# Patient Record
Sex: FEMALE | Race: BLACK OR AFRICAN AMERICAN | NOT HISPANIC OR LATINO | Employment: UNEMPLOYED | URBAN - METROPOLITAN AREA
[De-identification: names, ages, dates, MRNs, and addresses within clinical notes are randomized per-mention and may not be internally consistent; named-entity substitution may affect disease eponyms.]

---

## 2024-07-22 ENCOUNTER — APPOINTMENT (OUTPATIENT)
Dept: OBSTETRICS AND GYNECOLOGY | Facility: CLINIC | Age: 44
End: 2024-07-22
Payer: COMMERCIAL

## 2024-08-23 RX ORDER — ALBUTEROL SULFATE 90 UG/1
2 INHALANT RESPIRATORY (INHALATION) EVERY 4 HOURS PRN
COMMUNITY
Start: 2023-08-15

## 2024-08-23 RX ORDER — ARIPIPRAZOLE 2 MG/1
2 TABLET ORAL
COMMUNITY
Start: 2023-11-15

## 2024-08-23 RX ORDER — CITALOPRAM 10 MG/1
10 TABLET ORAL
COMMUNITY
Start: 2023-11-15

## 2024-08-23 RX ORDER — METRONIDAZOLE 500 MG/1
TABLET ORAL
COMMUNITY
Start: 2023-11-22

## 2024-08-23 RX ORDER — LIDOCAINE 50 MG/G
1 PATCH TOPICAL
COMMUNITY
Start: 2023-05-25

## 2024-08-23 RX ORDER — FERROUS SULFATE 325(65) MG
325 TABLET ORAL EVERY OTHER DAY
COMMUNITY
Start: 2024-07-16 | End: 2025-07-16

## 2024-08-23 RX ORDER — NORGESTIMATE AND ETHINYL ESTRADIOL 0.25-0.035
1 KIT ORAL DAILY
COMMUNITY

## 2024-08-23 RX ORDER — DESOGESTREL AND ETHINYL ESTRADIOL 0.15-0.03
1 KIT ORAL DAILY
COMMUNITY
Start: 2015-09-23

## 2024-08-23 RX ORDER — ACETAMINOPHEN 325 MG/1
2 TABLET ORAL EVERY 6 HOURS PRN
COMMUNITY
Start: 2023-05-25

## 2024-08-26 ENCOUNTER — OFFICE VISIT (OUTPATIENT)
Dept: PRIMARY CARE | Facility: CLINIC | Age: 44
End: 2024-08-26
Payer: COMMERCIAL

## 2024-08-26 ENCOUNTER — LAB (OUTPATIENT)
Dept: LAB | Facility: LAB | Age: 44
End: 2024-08-26
Payer: COMMERCIAL

## 2024-08-26 ENCOUNTER — APPOINTMENT (OUTPATIENT)
Dept: PRIMARY CARE | Facility: CLINIC | Age: 44
End: 2024-08-26

## 2024-08-26 VITALS
OXYGEN SATURATION: 99 % | HEIGHT: 64 IN | SYSTOLIC BLOOD PRESSURE: 122 MMHG | HEART RATE: 71 BPM | BODY MASS INDEX: 38.97 KG/M2 | DIASTOLIC BLOOD PRESSURE: 73 MMHG | WEIGHT: 228.25 LBS | RESPIRATION RATE: 16 BRPM | TEMPERATURE: 97.8 F

## 2024-08-26 DIAGNOSIS — Z00.00 ANNUAL PHYSICAL EXAM: ICD-10-CM

## 2024-08-26 DIAGNOSIS — Z13.220 LIPID SCREENING: ICD-10-CM

## 2024-08-26 DIAGNOSIS — Z13.1 SCREENING FOR DIABETES MELLITUS: ICD-10-CM

## 2024-08-26 DIAGNOSIS — Z13.29 SCREENING FOR THYROID DISORDER: ICD-10-CM

## 2024-08-26 DIAGNOSIS — Z00.00 ENCOUNTER FOR MEDICAL EXAMINATION TO ESTABLISH CARE: Primary | ICD-10-CM

## 2024-08-26 DIAGNOSIS — Z13.0 SCREENING FOR DEFICIENCY ANEMIA: ICD-10-CM

## 2024-08-26 DIAGNOSIS — J45.20 MILD INTERMITTENT ASTHMA WITHOUT COMPLICATION (HHS-HCC): ICD-10-CM

## 2024-08-26 DIAGNOSIS — Z13.89 SCREENING FOR BLOOD OR PROTEIN IN URINE: ICD-10-CM

## 2024-08-26 DIAGNOSIS — Z86.018 HISTORY OF UTERINE FIBROID: ICD-10-CM

## 2024-08-26 PROBLEM — M79.7 FIBROMYALGIA: Status: ACTIVE | Noted: 2023-07-19

## 2024-08-26 PROBLEM — F43.10 POST TRAUMATIC STRESS DISORDER: Status: ACTIVE | Noted: 2022-07-25

## 2024-08-26 PROBLEM — F33.1 MODERATE EPISODE OF RECURRENT MAJOR DEPRESSIVE DISORDER (MULTI): Chronic | Status: ACTIVE | Noted: 2022-07-25

## 2024-08-26 PROBLEM — F60.2 ANTISOCIAL PERSONALITY DISORDER (MULTI): Status: ACTIVE | Noted: 2017-01-03

## 2024-08-26 PROBLEM — L20.84 INTRINSIC ECZEMA: Status: ACTIVE | Noted: 2020-06-10

## 2024-08-26 PROBLEM — D25.9 FIBROID, UTERINE: Status: ACTIVE | Noted: 2024-08-26

## 2024-08-26 LAB
ALBUMIN SERPL BCP-MCNC: 4.1 G/DL (ref 3.4–5)
ALP SERPL-CCNC: 46 U/L (ref 33–110)
ALT SERPL W P-5'-P-CCNC: 7 U/L (ref 7–45)
ANION GAP SERPL CALC-SCNC: 10 MMOL/L (ref 10–20)
AST SERPL W P-5'-P-CCNC: 11 U/L (ref 9–39)
BILIRUB SERPL-MCNC: 0.3 MG/DL (ref 0–1.2)
BUN SERPL-MCNC: 12 MG/DL (ref 6–23)
CALCIUM SERPL-MCNC: 8.7 MG/DL (ref 8.6–10.3)
CHLORIDE SERPL-SCNC: 104 MMOL/L (ref 98–107)
CHOLEST SERPL-MCNC: 106 MG/DL (ref 0–199)
CHOLESTEROL/HDL RATIO: 3.8
CO2 SERPL-SCNC: 26 MMOL/L (ref 21–32)
CREAT SERPL-MCNC: 0.69 MG/DL (ref 0.5–1.05)
EGFRCR SERPLBLD CKD-EPI 2021: >90 ML/MIN/1.73M*2
ERYTHROCYTE [DISTWIDTH] IN BLOOD BY AUTOMATED COUNT: 22.1 % (ref 11.5–14.5)
GLUCOSE SERPL-MCNC: 96 MG/DL (ref 74–99)
HCT VFR BLD AUTO: 23.3 % (ref 36–46)
HDLC SERPL-MCNC: 27.8 MG/DL
HGB BLD-MCNC: 6 G/DL (ref 12–16)
LDLC SERPL CALC-MCNC: 40 MG/DL
MCH RBC QN AUTO: 17.2 PG (ref 26–34)
MCHC RBC AUTO-ENTMCNC: 25.8 G/DL (ref 32–36)
MCV RBC AUTO: 67 FL (ref 80–100)
NON HDL CHOLESTEROL: 78 MG/DL (ref 0–149)
NRBC BLD-RTO: 0 /100 WBCS (ref 0–0)
PLATELET # BLD AUTO: 301 X10*3/UL (ref 150–450)
POC APPEARANCE, URINE: CLEAR
POC BILIRUBIN, URINE: NEGATIVE
POC BLOOD, URINE: NEGATIVE
POC COLOR, URINE: YELLOW
POC GLUCOSE, URINE: NEGATIVE MG/DL
POC KETONES, URINE: NEGATIVE MG/DL
POC LEUKOCYTES, URINE: NEGATIVE
POC NITRITE,URINE: NEGATIVE
POC PH, URINE: 6 PH
POC PROTEIN, URINE: NEGATIVE MG/DL
POC SPECIFIC GRAVITY, URINE: 1.01
POC UROBILINOGEN, URINE: 0.2 EU/DL
POTASSIUM SERPL-SCNC: 3.9 MMOL/L (ref 3.5–5.3)
PROT SERPL-MCNC: 7.2 G/DL (ref 6.4–8.2)
RBC # BLD AUTO: 3.48 X10*6/UL (ref 4–5.2)
SODIUM SERPL-SCNC: 136 MMOL/L (ref 136–145)
TRIGL SERPL-MCNC: 192 MG/DL (ref 0–149)
TSH SERPL-ACNC: 1.27 MIU/L (ref 0.44–3.98)
VLDL: 38 MG/DL (ref 0–40)
WBC # BLD AUTO: 3.9 X10*3/UL (ref 4.4–11.3)

## 2024-08-26 PROCEDURE — 83036 HEMOGLOBIN GLYCOSYLATED A1C: CPT

## 2024-08-26 PROCEDURE — 99214 OFFICE O/P EST MOD 30 MIN: CPT | Performed by: NURSE PRACTITIONER

## 2024-08-26 PROCEDURE — 80053 COMPREHEN METABOLIC PANEL: CPT

## 2024-08-26 PROCEDURE — 36415 COLL VENOUS BLD VENIPUNCTURE: CPT

## 2024-08-26 PROCEDURE — 85027 COMPLETE CBC AUTOMATED: CPT

## 2024-08-26 PROCEDURE — 81003 URINALYSIS AUTO W/O SCOPE: CPT | Mod: QW | Performed by: NURSE PRACTITIONER

## 2024-08-26 PROCEDURE — 84443 ASSAY THYROID STIM HORMONE: CPT

## 2024-08-26 PROCEDURE — 80061 LIPID PANEL: CPT

## 2024-08-26 PROCEDURE — 3008F BODY MASS INDEX DOCD: CPT | Performed by: NURSE PRACTITIONER

## 2024-08-26 PROCEDURE — 99204 OFFICE O/P NEW MOD 45 MIN: CPT | Performed by: NURSE PRACTITIONER

## 2024-08-26 RX ORDER — ALBUTEROL SULFATE 90 UG/1
2 INHALANT RESPIRATORY (INHALATION) EVERY 6 HOURS PRN
Qty: 18 G | Refills: 0 | Status: SHIPPED | OUTPATIENT
Start: 2024-08-26

## 2024-08-26 SDOH — ECONOMIC STABILITY: FOOD INSECURITY: WITHIN THE PAST 12 MONTHS, THE FOOD YOU BOUGHT JUST DIDN'T LAST AND YOU DIDN'T HAVE MONEY TO GET MORE.: NEVER TRUE

## 2024-08-26 SDOH — ECONOMIC STABILITY: FOOD INSECURITY: WITHIN THE PAST 12 MONTHS, YOU WORRIED THAT YOUR FOOD WOULD RUN OUT BEFORE YOU GOT MONEY TO BUY MORE.: NEVER TRUE

## 2024-08-26 ASSESSMENT — PAIN SCALES - GENERAL: PAINLEVEL: 7

## 2024-08-26 ASSESSMENT — LIFESTYLE VARIABLES
AUDIT-C TOTAL SCORE: 2
SKIP TO QUESTIONS 9-10: 1
HOW OFTEN DO YOU HAVE A DRINK CONTAINING ALCOHOL: 2-4 TIMES A MONTH
HOW OFTEN DO YOU HAVE SIX OR MORE DRINKS ON ONE OCCASION: NEVER
HOW MANY STANDARD DRINKS CONTAINING ALCOHOL DO YOU HAVE ON A TYPICAL DAY: 1 OR 2

## 2024-08-26 ASSESSMENT — PATIENT HEALTH QUESTIONNAIRE - PHQ9
1. LITTLE INTEREST OR PLEASURE IN DOING THINGS: NOT AT ALL
SUM OF ALL RESPONSES TO PHQ9 QUESTIONS 1 AND 2: 3
8. MOVING OR SPEAKING SO SLOWLY THAT OTHER PEOPLE COULD HAVE NOTICED. OR THE OPPOSITE, BEING SO FIGETY OR RESTLESS THAT YOU HAVE BEEN MOVING AROUND A LOT MORE THAN USUAL: NEARLY EVERY DAY
3. TROUBLE FALLING OR STAYING ASLEEP OR SLEEPING TOO MUCH: NEARLY EVERY DAY
10. IF YOU CHECKED OFF ANY PROBLEMS, HOW DIFFICULT HAVE THESE PROBLEMS MADE IT FOR YOU TO DO YOUR WORK, TAKE CARE OF THINGS AT HOME, OR GET ALONG WITH OTHER PEOPLE: NOT DIFFICULT AT ALL
5. POOR APPETITE OR OVEREATING: NOT AT ALL
4. FEELING TIRED OR HAVING LITTLE ENERGY: NEARLY EVERY DAY
9. THOUGHTS THAT YOU WOULD BE BETTER OFF DEAD, OR OF HURTING YOURSELF: SEVERAL DAYS
SUM OF ALL RESPONSES TO PHQ QUESTIONS 1-9: 16
7. TROUBLE CONCENTRATING ON THINGS, SUCH AS READING THE NEWSPAPER OR WATCHING TELEVISION: NEARLY EVERY DAY
2. FEELING DOWN, DEPRESSED OR HOPELESS: NEARLY EVERY DAY
6. FEELING BAD ABOUT YOURSELF - OR THAT YOU ARE A FAILURE OR HAVE LET YOURSELF OR YOUR FAMILY DOWN: NOT AT ALL

## 2024-08-26 ASSESSMENT — COLUMBIA-SUICIDE SEVERITY RATING SCALE - C-SSRS
2. HAVE YOU ACTUALLY HAD ANY THOUGHTS OF KILLING YOURSELF?: NO
6. HAVE YOU EVER DONE ANYTHING, STARTED TO DO ANYTHING, OR PREPARED TO DO ANYTHING TO END YOUR LIFE?: NO
1. IN THE PAST MONTH, HAVE YOU WISHED YOU WERE DEAD OR WISHED YOU COULD GO TO SLEEP AND NOT WAKE UP?: NO

## 2024-08-26 ASSESSMENT — ENCOUNTER SYMPTOMS
DEPRESSION: 1
OCCASIONAL FEELINGS OF UNSTEADINESS: 0
LOSS OF SENSATION IN FEET: 0

## 2024-08-26 NOTE — PATIENT INSTRUCTIONS
New patient - establish care   AE with Fasting labs to be obtained.  UA - unremarkable.   She will be notified of results as they become available.     She states she has follow up with psychiatry.   Consider cymbalta -- history of fibromyalgia.  She is requesting referral to gynecology for follow up on fibroid and abnormal pap - she is off ocp's.

## 2024-08-26 NOTE — PROGRESS NOTES
Subjective   Patient ID: Janie Mcgrath is a 44 y.o. female who presents for New Patient Visit (Establish new PCP and get a physical).  HPI44 y.o. female with past medical history of metabolic syndrome, abnormal glandular Pap of cervix, fibroid uterus, wilian, ptsd, recurrent depressive disorder and antisocial personality disorder, fibromyalgia, and eczema presents today to establish care and annual physical exam.      Imbalances after intercourse/ history of fibroid uterus - Follows with Julissa Wallis at [facility]  She does have a history of fibromyalgia    Son passed away in 2021   Feels like people are trying to take things away from her.   Off abilify -- has follow up with psychiatry  Bipolar and ptsd -- she does not feel like she is bipolar   Single parent - 2 children at home, 1 incarcerated    Laser eye surgery as a child  4 vaginal delivery     Review of Systems  Review of systems: Present-feeling well. Not present-chills, fatigue and fever.  Skin: Not present- new lesions and rash.  HEENT: Not present-headache, ear pain, nasal congestion, and sore throat.  Neck: Not present-neck pain, neck stiffness and swollen glands.  Respiratory: Mild asthma.  Not present-difficulty breathing, cough, bloody sputum.  Cardiovascular: Not present-abnormal blood pressure, chest pain, edema, palpitations, dyspnea on exertion.  Gastrointestinal: Not present-abdominal pain, bloody or very black stools, changes in bowel habits, heartburn, nausea and vomiting.  Genitourinary: Not present-change in bladder habits, hematuria, flank pain and dysuria.  Musculoskeletal: Not present-back pain, joint pain, joint swelling, joint redness.  Neurological: Not present-dizziness, headache, numbness or tingling.  Psychiatric: See HPI.  Not present- suicidal ideation, suicidal planning, thoughts of hurting others and thoughts of self-harm.  Endocrine: Not present-appetite changes, polydipsia, polyuria, and thyroid problems.  Hematology:  Not  "present-anemia, excessive bleeding, bruising and epistaxis.    Objective   /73   Pulse 71   Temp 36.6 °C (97.8 °F) (Temporal)   Resp 16   Ht 1.626 m (5' 4\")   Wt 104 kg (228 lb 4 oz)   SpO2 99%   BMI 39.18 kg/m²      Physical Exam  Gen.: Mental status-alert. Gen. appearance-cooperative, well groomed and consistent with stated age. Not in acute distress or sickly. Orientation-oriented to time, place, purpose and person. Build and nutrition-well-nourished and well-developed. Hydration-well-hydrated.    Integumentary: Color-normal coloration skin. Skin moisture-normal skin moisture.    Head and neck: Head-normocephalic, atraumatic with no lesions or palpable masses. Face-atraumatic. Neck-full range of motion and subtle. No lymphadenopathy and no nuchal rigidity. Thyroid-normal size and consistency with no palpable lumps.    ENMT: Ears-no tenderness noted to the external auditory canal-bilaterally. Otoscopic exam: Tympanic membranes-left-tympanic membrane is gray in appearance. Right-tympanic membrane is gray in appearance. Nose -frontal sinuses-non-tender and no purulent drainage. Maxillary sinuses-non-tender and no purulent drainage. Mouth: Oral mucosa-pink and moist. Oropharynx: No airway distress, bulging of the pharyngeal wall, edema of the uvula, and no pharyngeal erythema.    Chest and lung exam: Auscultation-normal breath sounds, no adventitious lung sounds and normal vocal resonance. Chest wall is normal in shape and non-tender.    Cardiovascular: Auscultation: Regular rate and rhythm. Heart sounds-normal heart sounds, S1-S2. No murmurs or gallops appreciated. Carotid arteries normal and without bruit.    Abdomen: Non-tender, no rigidity, and no palpable masses. There is no hepatosplenomegaly. Auscultation-auscultation of the abdomen reveals normal bowel sounds throughout.     Peripheral vascular: Lower extremity-inspection is normal bilaterally. Normal temperature and no edema " bilaterally.    Neurologic: Mental ittjfe-wxyyxz-xpqvdyolazb. Cranial nerves: Cranial nerves II through XII grossly intact. Normal gait.    Musculoskeletal: Examination of the spine with no step-offs or point tenderness.  Full range of motion of the spine without pain or difficulty. Right lower extremity-normal strength and tone, normal range of motion without pain. Left lower extremity-normal strength and tone, normal range of motion without pain.  Assessment/Plan   Diagnoses and all orders for this visit:  Encounter for medical examination to establish care  -     POCT UA Automated manually resulted  Mild intermittent asthma without complication ([facility])  -     albuterol 90 mcg/actuation inhaler; Inhale 2 puffs every 6 hours if needed for wheezing.  Annual physical exam  -     CBC; Future  -     Comprehensive Metabolic Panel; Future  -     Hemoglobin A1C; Future  -     Lipid Panel; Future  -     TSH with reflex to Free T4 if abnormal; Future  Lipid screening  -     Lipid Panel; Future  Screening for diabetes mellitus  -     Comprehensive Metabolic Panel; Future  -     Hemoglobin A1C; Future  Screening for deficiency anemia  -     CBC; Future  Screening for thyroid disorder  -     TSH with reflex to Free T4 if abnormal; Future  Screening for blood or protein in urine  -     POCT UA Automated manually resulted  History of uterine fibroid  -     Referral to Gynecology; Future

## 2024-08-27 ENCOUNTER — DOCUMENTATION (OUTPATIENT)
Dept: PRIMARY CARE | Facility: CLINIC | Age: 44
End: 2024-08-27
Payer: COMMERCIAL

## 2024-08-27 LAB
EST. AVERAGE GLUCOSE BLD GHB EST-MCNC: 126 MG/DL
HBA1C MFR BLD: 6 %

## 2024-08-27 NOTE — PROGRESS NOTES
"Pathologist called on call pager regarding abnormal CBC with Hgb of 6.0. Called pt who stated that she feels \" off.\" Denies cp, sob, dizziness, heart palpitations, or blurry vision. The pt was recommended to go to ED for transfusion.   "

## 2024-08-28 ENCOUNTER — TELEPHONE (OUTPATIENT)
Dept: PRIMARY CARE | Facility: CLINIC | Age: 44
End: 2024-08-28
Payer: COMMERCIAL

## 2024-08-30 ENCOUNTER — TELEPHONE (OUTPATIENT)
Dept: PRIMARY CARE | Facility: CLINIC | Age: 44
End: 2024-08-30
Payer: COMMERCIAL

## 2024-09-16 ENCOUNTER — APPOINTMENT (OUTPATIENT)
Dept: OBSTETRICS AND GYNECOLOGY | Facility: CLINIC | Age: 44
End: 2024-09-16
Payer: COMMERCIAL

## 2024-09-30 ENCOUNTER — APPOINTMENT (OUTPATIENT)
Dept: OBSTETRICS AND GYNECOLOGY | Facility: CLINIC | Age: 44
End: 2024-09-30
Payer: COMMERCIAL

## 2024-09-30 VITALS
WEIGHT: 227 LBS | BODY MASS INDEX: 38.76 KG/M2 | HEIGHT: 64 IN | SYSTOLIC BLOOD PRESSURE: 118 MMHG | DIASTOLIC BLOOD PRESSURE: 64 MMHG

## 2024-09-30 DIAGNOSIS — B37.31 VULVAR CANDIDIASIS: ICD-10-CM

## 2024-09-30 DIAGNOSIS — D50.0 IRON DEFICIENCY ANEMIA DUE TO CHRONIC BLOOD LOSS: ICD-10-CM

## 2024-09-30 DIAGNOSIS — N93.9 ABNORMAL UTERINE BLEEDING (AUB): Primary | ICD-10-CM

## 2024-09-30 DIAGNOSIS — Z86.018 HISTORY OF UTERINE FIBROID: ICD-10-CM

## 2024-09-30 PROCEDURE — 3008F BODY MASS INDEX DOCD: CPT | Performed by: STUDENT IN AN ORGANIZED HEALTH CARE EDUCATION/TRAINING PROGRAM

## 2024-09-30 PROCEDURE — 99214 OFFICE O/P EST MOD 30 MIN: CPT | Performed by: STUDENT IN AN ORGANIZED HEALTH CARE EDUCATION/TRAINING PROGRAM

## 2024-09-30 PROCEDURE — 99459 PELVIC EXAMINATION: CPT | Performed by: STUDENT IN AN ORGANIZED HEALTH CARE EDUCATION/TRAINING PROGRAM

## 2024-09-30 RX ORDER — TRANEXAMIC ACID 650 MG/1
1300 TABLET ORAL 3 TIMES DAILY
Qty: 90 TABLET | Refills: 3 | Status: SHIPPED | OUTPATIENT
Start: 2024-09-30 | End: 2024-10-05

## 2024-09-30 RX ORDER — FERROUS GLUCONATE 324(38)MG
38 TABLET ORAL EVERY OTHER DAY
Qty: 45 TABLET | Refills: 3 | Status: SHIPPED | OUTPATIENT
Start: 2024-09-30 | End: 2025-09-30

## 2024-09-30 RX ORDER — ASCORBIC ACID 500 MG
500 TABLET ORAL EVERY OTHER DAY
Qty: 45 TABLET | Refills: 3 | Status: SHIPPED | OUTPATIENT
Start: 2024-09-30 | End: 2025-09-30

## 2024-09-30 ASSESSMENT — PAIN SCALES - GENERAL: PAINLEVEL: 0-NO PAIN

## 2024-09-30 NOTE — ASSESSMENT & PLAN NOTE
Reviewed physiology of menstrual cycle including roles of estrogen and progesterone. Discussed cyclic nature of hormones and roles in ovulation and menstruation.  Reviewed various etiologies of abnormal uterine bleeding including structural (polyps, adenomyosis, fibroids, malignancy) and physiologic causes (clotting disorder, ovulatory dysfunction, iatrogenic source).  Previous imagin- TVUS with multiple calcified fibroids.  Previous biopsies/surgeries: none.  Based on her risk factors of 12w sized uterus on exam, differential diagnosis includes fibroids.  Prior treatments: none.  Discussed management options including various forms of hormonal control (pill, patch, ring, injection, implant and IUD) as well as surgical approaches. Briefly discussed risks, benefits of each.   Patient does desire future fertility.  Recommendations:  - Imaging: TVUS, consider pelvic MRI pending visualization.  - Management: SLYND + Lysteda PRN given tobacco use and desire for contraception. If failure to control on SLYND, consider aygestin/provera + alternative contraception method

## 2024-09-30 NOTE — PROGRESS NOTES
Subjective   Patient ID: Janie Mcgrath is a 44 y.o. female who presents for New Patient Visit (NPV=  Fibroids, heavy menses//LAST PAP: 04/2023 WNL/LAST MAMM: //Chaperone Declined: RHYS Gooden/) and Multiple Gestational Losses.    Heavy menstrual bleeding for years. Feels like it is getting worse. Has not tried any treatment before (surgeries, IUD, medications). Periods every month, lasting 5 days. First 3 days very heavy bleeding, has to miss work and wear diapers. Significant weakness with periods Previously tried depo prior to children with prolonged bleeding. Also reports decreased libido.    Previously with Anemia to Hgb 6.0, referred to hospital at that time but did not present. Did restart iron pills.    No other medical problems. No HTN. No fam Hx VTE. 2 cigs/day.    Objective   Physical Exam  Vitals reviewed. Exam conducted with a chaperone present.   Constitutional:       Appearance: Normal appearance.   HENT:      Head: Normocephalic.   Cardiovascular:      Rate and Rhythm: Normal rate and regular rhythm.   Pulmonary:      Effort: Pulmonary effort is normal. No respiratory distress.   Abdominal:      General: There is no distension.      Palpations: Abdomen is soft.      Tenderness: There is no abdominal tenderness. There is no guarding or rebound.      Comments: Uterine fundus palpable above pubic symphysis, mobile   Genitourinary:     Comments: Normal appearing external female genitalia. Vulva without lesions. Vaginal mucosa normal appearing with physiologic discharge and no lesions. Cervix normal appearing without lesions.     Bimanual with 12-w sized, mobile uterus with anterior-fundal and posterior fibroids palpated; able to elevate out of the epelvis. No adnexal masses or tenderness.  Skin:     General: Skin is warm and dry.   Neurological:      General: No focal deficit present.      Mental Status: She is alert.   Psychiatric:         Mood and Affect: Mood normal.         Behavior: Behavior  normal.         Thought Content: Thought content normal.         Judgment: Judgment normal.         Assessment/Plan   Problem List Items Addressed This Visit             ICD-10-CM    Iron deficiency anemia due to chronic blood loss D50.0     Repeat CBC and Iron studies today  Iron refilled         Abnormal uterine bleeding (AUB) - Primary N93.9     Reviewed physiology of menstrual cycle including roles of estrogen and progesterone. Discussed cyclic nature of hormones and roles in ovulation and menstruation.  Reviewed various etiologies of abnormal uterine bleeding including structural (polyps, adenomyosis, fibroids, malignancy) and physiologic causes (clotting disorder, ovulatory dysfunction, iatrogenic source).  Previous imagin- TVUS with multiple calcified fibroids.  Previous biopsies/surgeries: none.  Based on her risk factors of 12w sized uterus on exam, differential diagnosis includes fibroids.  Prior treatments: none.  Discussed management options including various forms of hormonal control (pill, patch, ring, injection, implant and IUD) as well as surgical approaches. Briefly discussed risks, benefits of each.   Patient does desire future fertility.  Recommendations:  - Imaging: TVUS, consider pelvic MRI pending visualization.  - Management: SLYND + Lysteda PRN given tobacco use and desire for contraception. If failure to control on SLYND, consider aygestin/provera + alternative contraception method         Relevant Medications    drospirenone, contraceptive, 4 mg (28) tablet    tranexamic acid (Lysteda) 650 mg tablet tablet    ferrous gluconate 324 (38 Fe) mg tablet    ascorbic acid (Vitamin C) 500 mg tablet    Other Relevant Orders    CBC    Iron and TIBC    Ferritin     Other Visit Diagnoses         Codes    History of uterine fibroid     Z86.018    Relevant Orders    US PELVIS TRANSABDOMINAL WITH TRANSVAGINAL                 Weston Cash MD 24 11:36 AM

## 2024-10-11 ENCOUNTER — HOSPITAL ENCOUNTER (OUTPATIENT)
Dept: RADIOLOGY | Facility: CLINIC | Age: 44
Discharge: HOME | End: 2024-10-11
Payer: COMMERCIAL

## 2024-10-11 DIAGNOSIS — Z86.018 HISTORY OF UTERINE FIBROID: ICD-10-CM

## 2024-10-11 PROCEDURE — 76856 US EXAM PELVIC COMPLETE: CPT

## 2024-10-14 ENCOUNTER — TELEPHONE (OUTPATIENT)
Dept: OBSTETRICS AND GYNECOLOGY | Facility: CLINIC | Age: 44
End: 2024-10-14
Payer: COMMERCIAL

## 2024-10-14 NOTE — TELEPHONE ENCOUNTER
Pt contacted.   Discussed fibroids found on US.   Explained the goal for taking slynd is to help control/lesson her bleeding as opposed to shrinking the fibroids.    She will  medicine today.  If wants to discuss surgical options, pt aware to make appt.

## 2024-10-25 ENCOUNTER — TELEPHONE (OUTPATIENT)
Dept: OBSTETRICS AND GYNECOLOGY | Facility: CLINIC | Age: 44
End: 2024-10-25
Payer: COMMERCIAL

## 2024-10-25 NOTE — TELEPHONE ENCOUNTER
"Attempted to reach pt by phone to further discuss her message.  Received recording stating \"Caller not accepting calls at this time\"   office will try again later.  "

## 2024-10-28 NOTE — TELEPHONE ENCOUNTER
"2nd attempt made to reach pt.    Unable to leaves message as \"subscriber not accepting calls at this time\"  "

## 2024-11-15 ENCOUNTER — TELEPHONE (OUTPATIENT)
Dept: OBSTETRICS AND GYNECOLOGY | Facility: CLINIC | Age: 44
End: 2024-11-15
Payer: COMMERCIAL

## 2024-11-15 NOTE — TELEPHONE ENCOUNTER
Copied from CRM #[id number]. Topic: Information Request - Doctor, Hospital, or Provider  >> Nov 15, 2024  9:52 AM Beata LANDAVERDE wrote:  Janie states she is leaking fluids and wants to know if she can get a referral for a MRI.    Pt contacted.   Discussed making an appt with dr neves for further eval.  Transferred to  to see if her appt on 12/20/24 can be moved up sooner.

## 2024-11-18 ENCOUNTER — LAB (OUTPATIENT)
Dept: LAB | Facility: LAB | Age: 44
End: 2024-11-18
Payer: COMMERCIAL

## 2024-11-18 ENCOUNTER — APPOINTMENT (OUTPATIENT)
Dept: OBSTETRICS AND GYNECOLOGY | Facility: CLINIC | Age: 44
End: 2024-11-18
Payer: COMMERCIAL

## 2024-11-18 VITALS
SYSTOLIC BLOOD PRESSURE: 128 MMHG | WEIGHT: 227 LBS | BODY MASS INDEX: 38.76 KG/M2 | DIASTOLIC BLOOD PRESSURE: 74 MMHG | HEIGHT: 64 IN

## 2024-11-18 DIAGNOSIS — N93.9 ABNORMAL UTERINE BLEEDING (AUB): ICD-10-CM

## 2024-11-18 DIAGNOSIS — F33.1 MODERATE EPISODE OF RECURRENT MAJOR DEPRESSIVE DISORDER: Primary | Chronic | ICD-10-CM

## 2024-11-18 DIAGNOSIS — N89.8 VAGINAL IRRITATION: ICD-10-CM

## 2024-11-18 DIAGNOSIS — N89.8 VAGINAL DISCHARGE: ICD-10-CM

## 2024-11-18 LAB
ERYTHROCYTE [DISTWIDTH] IN BLOOD BY AUTOMATED COUNT: 22.5 % (ref 11.5–14.5)
FERRITIN SERPL-MCNC: 20 NG/ML (ref 8–150)
HCT VFR BLD AUTO: 25.1 % (ref 36–46)
HGB BLD-MCNC: 6.8 G/DL (ref 12–16)
IRON SATN MFR SERPL: 5 % (ref 25–45)
IRON SERPL-MCNC: 22 UG/DL (ref 35–150)
MCH RBC QN AUTO: 19.1 PG (ref 26–34)
MCHC RBC AUTO-ENTMCNC: 27.1 G/DL (ref 32–36)
MCV RBC AUTO: 71 FL (ref 80–100)
NRBC BLD-RTO: 0 /100 WBCS (ref 0–0)
PLATELET # BLD AUTO: 397 X10*3/UL (ref 150–450)
RBC # BLD AUTO: 3.56 X10*6/UL (ref 4–5.2)
TIBC SERPL-MCNC: 457 UG/DL (ref 240–445)
UIBC SERPL-MCNC: 435 UG/DL (ref 110–370)
WBC # BLD AUTO: 5.6 X10*3/UL (ref 4.4–11.3)

## 2024-11-18 PROCEDURE — 82728 ASSAY OF FERRITIN: CPT

## 2024-11-18 PROCEDURE — 36415 COLL VENOUS BLD VENIPUNCTURE: CPT

## 2024-11-18 PROCEDURE — 99214 OFFICE O/P EST MOD 30 MIN: CPT | Performed by: STUDENT IN AN ORGANIZED HEALTH CARE EDUCATION/TRAINING PROGRAM

## 2024-11-18 PROCEDURE — 83540 ASSAY OF IRON: CPT

## 2024-11-18 PROCEDURE — 99459 PELVIC EXAMINATION: CPT | Performed by: STUDENT IN AN ORGANIZED HEALTH CARE EDUCATION/TRAINING PROGRAM

## 2024-11-18 PROCEDURE — 3008F BODY MASS INDEX DOCD: CPT | Performed by: STUDENT IN AN ORGANIZED HEALTH CARE EDUCATION/TRAINING PROGRAM

## 2024-11-18 PROCEDURE — 83550 IRON BINDING TEST: CPT

## 2024-11-18 PROCEDURE — 85027 COMPLETE CBC AUTOMATED: CPT

## 2024-11-18 PROCEDURE — 87205 SMEAR GRAM STAIN: CPT

## 2024-11-18 RX ORDER — CITALOPRAM 10 MG/1
10 TABLET ORAL DAILY
Qty: 30 TABLET | Refills: 0 | Status: SHIPPED | OUTPATIENT
Start: 2024-11-18 | End: 2025-11-18

## 2024-11-18 ASSESSMENT — PAIN SCALES - GENERAL: PAINLEVEL_OUTOF10: 0-NO PAIN

## 2024-11-18 NOTE — ASSESSMENT & PLAN NOTE
No evidence of infection  Vaginitis swab sent  Will follow results  Counseled that this may be normal discharge, possibly exacerbated by altered microbiome as the result of heavy menstrual bleeding

## 2024-11-18 NOTE — PROGRESS NOTES
Subjective   Patient ID: Janie Mcgrath is a 44 y.o. female who presents for Follow-up.    Reports increased vaginal discharge, including dripping after shower. Occasionally soaks through underwear. No odor, no significant color. Occasional aching pain, behaves like a headache and will ultimately resolve.     Started her birth control on Sunday, previous period was bad.    No change in discharge with coughing, laughing, sneezing, bending.    Objective   Physical Exam  Vitals reviewed. Exam conducted with a chaperone present.   Constitutional:       Appearance: Normal appearance.   HENT:      Head: Normocephalic.   Cardiovascular:      Rate and Rhythm: Normal rate and regular rhythm.   Pulmonary:      Effort: Pulmonary effort is normal. No respiratory distress.   Abdominal:      General: There is no distension.      Palpations: Abdomen is soft. There is no mass.      Tenderness: There is no abdominal tenderness. There is no guarding or rebound.   Genitourinary:     Comments: Normal appearing external female genitalia. Vulva without lesions. Vaginal mucosa normal appearing with moderate amount of clear/edmond discharge and no lesions. Cervix normal appearing without lesions. No evidence of infection.    Skin:     General: Skin is warm and dry.   Neurological:      General: No focal deficit present.      Mental Status: She is alert.   Psychiatric:         Mood and Affect: Mood normal.         Behavior: Behavior normal.         Thought Content: Thought content normal.         Judgment: Judgment normal.         Assessment/Plan   Problem List Items Addressed This Visit             ICD-10-CM    Moderate episode of recurrent major depressive disorder - Primary (Chronic) F33.1     30 day courtesy refill of celexa supplied         Relevant Medications    citalopram (CeleXA) 10 mg tablet    Abnormal uterine bleeding (AUB) N93.9     Just started SLYND this weekend, encouraged ongoing use  Reviewed how to use Lysteda (previously  prescibed)  Encouraged CBC, iron studies as Hgb in 08/2024 6.0         Vaginal discharge N89.8     No evidence of infection  Vaginitis swab sent  Will follow results  Counseled that this may be normal discharge, possibly exacerbated by altered microbiome as the result of heavy menstrual bleeding          Other Visit Diagnoses         Codes    Vaginal irritation     N89.8    Relevant Orders    Vaginitis Gram Stain For Bacterial Vaginosis + Yeast                 Weston Cash MD 11/18/24 4:08 PM

## 2024-11-18 NOTE — ASSESSMENT & PLAN NOTE
Just started SLYND this weekend, encouraged ongoing use  Reviewed how to use Lysteda (previously prescibed)  Encouraged CBC, iron studies as Hgb in 08/2024 6.0

## 2024-11-19 ENCOUNTER — TELEPHONE (OUTPATIENT)
Dept: OBSTETRICS AND GYNECOLOGY | Facility: CLINIC | Age: 44
End: 2024-11-19
Payer: COMMERCIAL

## 2024-11-19 LAB
BACTERIAL VAGINOSIS VAG-IMP: NORMAL
CLUE CELLS VAG LPF-#/AREA: NORMAL /[LPF]
NUGENT SCORE: 5
YEAST VAG WET PREP-#/AREA: NORMAL

## 2024-11-19 NOTE — TELEPHONE ENCOUNTER
----- Message from Weston Cash sent at 11/19/2024  7:08 AM EST -----  Can you please contact Ms. Mcgrath and recommend that she present to either [facility] or [facility] ED for blood transfusion as her Hgb is 6.8? If she declines, please let me know and we can get her set up for iron infusions. Thanks!

## 2024-11-19 NOTE — TELEPHONE ENCOUNTER
Pt contacted.   Pt informed of cbc result and need for blood transfusion.   Stressed importance of getting this as soon as she can. Would not recommend putting this off til Friday.  Understanding voiced.   Pt agreeable to go to ED.  Nurse will make provider aware,

## 2024-12-02 DIAGNOSIS — F33.1 MODERATE EPISODE OF RECURRENT MAJOR DEPRESSIVE DISORDER: Chronic | ICD-10-CM

## 2024-12-02 RX ORDER — CITALOPRAM 10 MG/1
10 TABLET ORAL DAILY
Qty: 90 TABLET | Refills: 1 | Status: SHIPPED | OUTPATIENT
Start: 2024-12-02

## 2024-12-02 NOTE — TELEPHONE ENCOUNTER
Attempted to reach pt by phone x 2-voice mail not set up.  Left message with leandro, emergency  to have pt call office.     RE: check if pt went to ED for  blood transfusion or not.   Dr neves aware we've been trying to reach her.  Pt may have decided not to go and that is fine.

## 2024-12-02 NOTE — TELEPHONE ENCOUNTER
"Attempted to reach pt by phone x 2.    \"Voice mail not set up yet\"  unable to leave message.  Office attempted to call leandro, her emergency .  Got his voice mail.  Message left with him asking to have pt call us back.  RE:  pt as instructed to go to ED on 11/19/24 for blood transfusion.    hgb was 6.8.  Upon further chart review,  not sure if pt was seen/evaluated or not?  Did she go elsewhere for care?  Await call back to discuss.    "

## 2024-12-03 NOTE — TELEPHONE ENCOUNTER
"Attempted to reach pt by phone.   \"Voice mail not been set up yet\"  office unable to leave message.    Office will send a [employer] message to pt, providing option of iv iron infusion(s) at the  outpatient clinics in [place] or [facility] per dr neves.  "

## 2024-12-03 NOTE — TELEPHONE ENCOUNTER
Pt contacted by phone.  Pt was given iv iron option.  Discussed her lab results again.  Pt is now rethinking the recommendation for a blood transfusion.  Understands that dr neves prefers that option.  Pt instructed she can make that decision as her symptoms present moving forward.  She will call back back to let us know if/when she wants iv iron infusion instead.

## 2024-12-30 ENCOUNTER — APPOINTMENT (OUTPATIENT)
Dept: OBSTETRICS AND GYNECOLOGY | Facility: CLINIC | Age: 44
End: 2024-12-30
Payer: COMMERCIAL

## 2025-02-17 ENCOUNTER — APPOINTMENT (OUTPATIENT)
Dept: OBSTETRICS AND GYNECOLOGY | Facility: CLINIC | Age: 45
End: 2025-02-17
Payer: COMMERCIAL

## 2025-02-20 ENCOUNTER — TELEPHONE (OUTPATIENT)
Dept: OBSTETRICS AND GYNECOLOGY | Facility: CLINIC | Age: 45
End: 2025-02-20

## 2025-02-20 ENCOUNTER — APPOINTMENT (OUTPATIENT)
Dept: OBSTETRICS AND GYNECOLOGY | Facility: CLINIC | Age: 45
End: 2025-02-20
Payer: COMMERCIAL

## 2025-05-08 ENCOUNTER — APPOINTMENT (OUTPATIENT)
Dept: OBSTETRICS AND GYNECOLOGY | Facility: CLINIC | Age: 45
End: 2025-05-08
Payer: COMMERCIAL

## 2025-05-08 VITALS
BODY MASS INDEX: 39.27 KG/M2 | HEIGHT: 64 IN | DIASTOLIC BLOOD PRESSURE: 76 MMHG | SYSTOLIC BLOOD PRESSURE: 128 MMHG | WEIGHT: 230 LBS

## 2025-05-08 DIAGNOSIS — N93.9 ABNORMAL UTERINE BLEEDING (AUB): Primary | ICD-10-CM

## 2025-05-08 PROCEDURE — 99214 OFFICE O/P EST MOD 30 MIN: CPT | Performed by: STUDENT IN AN ORGANIZED HEALTH CARE EDUCATION/TRAINING PROGRAM

## 2025-05-08 PROCEDURE — 3008F BODY MASS INDEX DOCD: CPT | Performed by: STUDENT IN AN ORGANIZED HEALTH CARE EDUCATION/TRAINING PROGRAM

## 2025-05-08 RX ORDER — CETIRIZINE HYDROCHLORIDE 10 MG/1
1 TABLET ORAL
COMMUNITY
Start: 2024-12-19

## 2025-05-08 RX ORDER — NORGESTIMATE AND ETHINYL ESTRADIOL 0.25-0.035
1 KIT ORAL DAILY
Qty: 84 TABLET | Refills: 3 | Status: SHIPPED | OUTPATIENT
Start: 2025-05-08 | End: 2026-05-08

## 2025-05-08 ASSESSMENT — PAIN SCALES - GENERAL: PAINLEVEL_OUTOF10: 0-NO PAIN

## 2025-05-08 NOTE — ASSESSMENT & PLAN NOTE
Again reviewed etiology of heavy bleeding- fibroids  Reviewed management methods including menstrual suppression with hormonal options, surgical approaches  Discussed risks, benefits of each approach and handouts provided  At this time, wishes to trial cOCP and consider longer acting options- encouraged cyclic OCP use to allow for predictable period  Encouraged filling of Rx at  pharmacy to allow for mail order refills

## 2025-05-08 NOTE — PROGRESS NOTES
Subjective   Patient ID: Janie Mcgrath is a 45 y.o. female who presents for Vaginal Bleeding (Heavy / clots bleeding ).    Previously prescribed lysteda and SLYND. Bleeding not improved with SLYND for a month or two, stopped taking. Never filled Lysteda prescription- does not have car, hard to get Rx's. [facility]. Soaking through clothes onto furniture with periods and passing large clots.     Objective   Physical Exam  Constitutional:       General: She is not in acute distress.     Appearance: Normal appearance.   HENT:      Head: Normocephalic.   Cardiovascular:      Rate and Rhythm: Normal rate.   Pulmonary:      Effort: Pulmonary effort is normal. No respiratory distress.   Skin:     General: Skin is warm and dry.   Neurological:      Mental Status: She is alert and oriented to person, place, and time.   Psychiatric:         Mood and Affect: Mood normal.         Behavior: Behavior normal.         Thought Content: Thought content normal.         Judgment: Judgment normal.       Assessment/Plan   Problem List Items Addressed This Visit           ICD-10-CM    Abnormal uterine bleeding (AUB) - Primary N93.9    Again reviewed etiology of heavy bleeding- fibroids  Reviewed management methods including menstrual suppression with hormonal options, surgical approaches  Discussed risks, benefits of each approach and handouts provided  At this time, wishes to trial cOCP and consider longer acting options- encouraged cyclic OCP use to allow for predictable period  Encouraged filling of Rx at  pharmacy to allow for mail order refills         Relevant Medications    norgestimate-ethinyl estradiol (Ortho-Cyclen) 0.25-0.035 mg tablet            Weston Cash MD 05/08/25 9:42 AM

## 2025-07-02 ENCOUNTER — TELEPHONE (OUTPATIENT)
Dept: OBSTETRICS AND GYNECOLOGY | Facility: CLINIC | Age: 45
End: 2025-07-02
Payer: COMMERCIAL

## 2025-07-02 NOTE — TELEPHONE ENCOUNTER
"Patient called asking \"what kind of fibrosis does she have.\" Please contact the patient and discuss. Thank you.  "

## 2025-07-02 NOTE — TELEPHONE ENCOUNTER
Pt contacted.    Pt informed fibrosis deals with the lungs.  It is not the same as her fibroid uterus.  Understanding voiced.

## 2025-08-11 ENCOUNTER — APPOINTMENT (OUTPATIENT)
Dept: OBSTETRICS AND GYNECOLOGY | Facility: CLINIC | Age: 45
End: 2025-08-11
Payer: COMMERCIAL